# Patient Record
(demographics unavailable — no encounter records)

---

## 2024-10-22 NOTE — ASSESSMENT
[FreeTextEntry1] : 75 year F WITH MODERATE RT KNEE PAIN. HAS TRIED GEL INJECTIONS IN THE PAST WITH NO SIGNIFICANT RELIEF. HAS CSI IN JUNE WHICH HELPED. PAIN WORSENS WITH STAIRS AND WALKING PROLONGED DISTANCES. PAIN IS AFFECTING ADL AND FUNCTIONAL ACTIVITIES. XRAYS REVIEWED WITH MODERATE TO ADVANCED TRICOMPARTMENTAL OA. TREATMENT OPTIONS REVIEWED. QUESTIONS ANSWERED. RT TKA DISCUSSED AT LENGTH. ALLERGY TESTING DISCUSSED.   *ANAPHYLACTIC REACTION TO TYLENOL/PENICILLIN PMHx: HTN, H/O BLADDER CANCER, HLD, SMALL BOWEL OBSTRUCTION - UNKNOWN CAUSE *POSSIBLE METAL ALLERGY NO H/O DM NO H/O DVT/PE NO H/O MRSA/VRSA  RT TKA -   DISCUSSED R&B OF TKA. WILL ORDER CT TO EVAL FOR BONE LOSS AND DEFORMITY FOR PRE-OP PLANNING.   The patient was advised of the diagnosis. The natural history of the pathology was explained in full to the patient in layman's terms. All questions were answered. The risks and benefits of surgical and non-surgical treatment alternatives were explained in full to the patient.   We discussed my findings and the natural history of their condition. We talked about the details of the proposed surgery and the recovery. We discussed the material risks, possible benefits and alternatives to surgery. The risks include but are not limited to infection, bleeding and possible need for blood transfusion, fracture, bowel blockage, bladder retention or infection, need for reoperation, stiffness and/or limited range of motion, possible damage to nerves and blood vessels, failure of fixation of components, risk of deep vein thromboses and pulmonary embolism, wound healing problems. Although incredibly rare, we also discussed the risks of a cardiac event, stroke and even death during, or following, the surgery. We discussed the type of implants the patient will be receiving and the type of fixation that will be used, as well as whether a robot or computer navigation aide will be used. The patient understands they will need medical clearance and will attend a preoperative joint education class. We also discussed the type of anesthesia they will receive, and the risks associated with hospital or rehab length of stay, obesity, diabetes and smoking.   Patient Complains of pain in the affected joint with a level that often reaches greater than a 8/10. The pain has been progressively worsening throughout his/her treatment course. The pain has interfered with their ADLs and worsens with weight bearing. On exam they often have episodes of swelling/effusion with limited ROM. Pain worsens with ROM passive and active and I can palpate crepitus.   X- rays were reviewed with the patient and they show joint space narrowing, subchondral sclerosis, osteophyte formation, and subchondral cysts. After a period of more than 12 weeks physical therapy or exercise program done with me or another treating physician they have continued pain. The patient has failed a trial of NSAID medication or pain relievers if they were unable to tolerate NSAID medications as well as a series of injections, steroids, or hyaluronic acid. After a long discussion with the patient both the patient and I have decided we have exhausted all forms of less radical treatments and they would like to proceed with Total Joint Replacement.   Patient will plan to schedule surgery. Patient requires rolling walker and 3-in-1 commode S/P surgery. Patient currently as limited mobility that significantly impairs their ability to participate in one or more mobility related activities of daily living in the home. The patient is able to safely use the walker and the functional mobility deficit can be sufficiently resolved with the use of a walker. Patient will also be confined to one level of the home environment and there is no toilet on that level. Requires 3-in-1 commode for bedside use as patient cannot access bathroom safely in their home in timely manner. Will order rolling walker and 3-in-1 commode.

## 2024-10-22 NOTE — DISCUSSION/SUMMARY
[de-identified] : I, Dian Frost, am scribing for Dr. Padron in his presence for the chief complaint, physical exam, studies, assessment, and/or plan.
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
## 2024-10-22 NOTE — PHYSICAL EXAM
[Right] : right knee [] : patient ambulates without assistive device [TWNoteComboBox7] : flexion 130 degrees [de-identified] : extension 0 degrees

## 2024-11-14 NOTE — PHYSICAL EXAM
[Rhythm Regular] : regular [Normal S1] : normal S1 [Normal S2] : normal S2 [No Murmur] : no murmurs heard [No Pitting Edema] : no pitting edema present [No Abnormalities] : the abdominal aorta was not enlarged and no bruit was heard [Normal] : moves all extremities, no focal deficits, normal speech

## 2024-11-15 NOTE — HISTORY OF PRESENT ILLNESS
[FreeTextEntry1] : 75 year old Woman with a history of HTN, HLD, idiopathic dilated cardiomyopathy EF 40% s/p Martinez SL ICD implant on 3/3/08 s/p generator change on 1/18/18, bladder cancer (s/p radical cystectomy and DEVAUGHN with urostomy creation) and has been receiving BCG injections, and has had multiple cystoscopy procedures. She presents for cardiac clearance for her right knee surgery on Monday 11/25/24 by Dr. Larry Padron at [facility].  On routine ICD check 12/18 she was found to have a 10 second episode of probable V. tach occurring at a rate of 158 beats per minute for 10 secs. Because of a self-limited the patient no symptoms and it did not cause a defibrillator event. At the present time the patient is asymptomatic. Last ICD check 8/21 showed no arrhythmia.  One episode of non-sustained V. tach for 13 beats at 179 bpm 1/21. She is on Synthroid 0.025 mg once a day ICD check 6/20 demonstrated SVT for 40secs at rate of 153bpm.  October 2013, she underwent a definitive surgical procedure to remove the bladder and creation of an ileal conduit. This was performed at [facility]. She had an echocardiogram at that time that showed a drop in her ejection fraction to 48% and she was started on Toprol 25 mg once a day as a cardioprotective drug. This made her hypotensive and dizzy and this was stopped.  Most recent echo performed 1/19 demonstrates an ejection fraction of 50%. There is minimal MR, and mild AI and TR with stage I diastolic dysfunction.  She had been doing quite well. Her dizziness has improved. She reports it was a eustachian ear problem which was resolved with nasal spray medication given by her PCP.  When she did have dizziness in 7/2022, she ended up going to urgent care, where she was found to have frequent PVCs, and was sent to the ER. There, she was found to be significantly orthostatic. Since then, it has been resolved.   Echo in 12/21 with possible mild decline in EF to 45%. I repeated the study 8/9/22, and confirmed mild LV dysfunction, with an EF 44%.  She had a single episode of 6 second of NSVT on 5/28/2022. A pharmacologic stress test in 10/2022 demonstrated possible infarct of the inferior and infero-apical walls, with an EF of 45%.  When I repeated a routine echocardiogram in 2/2023, her EF was noted to be lower, at 38% with eccentric LVH. Her LDL has been well controlled in the 90 range. In 3/2023, she had a CTA coronary demonstrated non obstructive CAD, mild LAD (40-50% luminal narrowing), mild RCA (30%).  in 2024  She was seen in 11/23. She he has no chest pain, difficulty breathing or palpitations.  Echo in 12/21 with possible mild decline in EF to 45%. This has stabilized at around 40% on her last echocardiogram (the apex, septum and basal inferior wall were hypokinetic).  Most recent echo on 4/22/24 revealing LV systolic function moderately decreased at EF 40%.  She presents today for cardiac clearance for her right knee replacement. She has no complaints.  She denies chest pains or pressure, chest pressure, dizzy spells, near syncope or syncope.  She denies difficulty breathing while lying flat. She denies lower extremity swelling.  Medication reconciliation was performed. She is complaint with her medications.

## 2024-11-15 NOTE — CARDIOLOGY SUMMARY
[de-identified] : sinus PVD [de-identified] : October 2022\par  Pharmacologic\par  Small mild defect in the inferior and inferior apical walls that is fixed suggestive of infarct\par  Inferior wall and septum mildly hypokinetic\par  LVEF 45% [de-identified] : 4/22/24: EF 40%, +RWMA, septum & basal inferior wall appear more HK. Normal RV size & fxn, LA dilated, mild MR, mild AI and trace pericardial effusion.   7/2023 EF 41%, Eccentric LVH, Mild grade 1 diastolic, Mild MR/TR [de-identified] : 3/2023: CTA coronary with non obstructive CAD, mild LAD (40-50% luminal narrowing), mild RCA (30%). Trace pericardial effusion Patent PFO suspected [de-identified] : [employer] ICD Remote September 2023 Battery longevity 5.4 years Normally functioning Riata lead

## 2024-11-15 NOTE — HISTORY OF PRESENT ILLNESS
[FreeTextEntry1] : 75 year old Woman with a history of HTN, HLD, idiopathic dilated cardiomyopathy EF 40% s/p Martinez SL ICD implant on 3/3/08 s/p generator change on 1/18/18, bladder cancer (s/p radical cystectomy and DEVAUGHN with urostomy creation) and has been receiving BCG injections, and has had multiple cystoscopy procedures. She presents for cardiac clearance for her right knee surgery on Monday 11/25/24 by Dr. Larry Padron at [facility].  On routine ICD check 12/18 she was found to have a 10 second episode of probable V. tach occurring at a rate of 158 beats per minute for 10 secs. Because of a self-limited the patient no symptoms and it did not cause a defibrillator event. At the present time the patient is asymptomatic. Last ICD check 8/21 showed no arrhythmia.  One episode of non-sustained V. tach for 13 beats at 179 bpm 1/21. She is on Synthroid 0.025 mg once a day ICD check 6/20 demonstrated SVT for 40secs at rate of 153bpm.  October 2013, she underwent a definitive surgical procedure to remove the bladder and creation of an ileal conduit. This was performed at [facility]. She had an echocardiogram at that time that showed a drop in her ejection fraction to 48% and she was started on Toprol 25 mg once a day as a cardioprotective drug. This made her hypotensive and dizzy and this was stopped.  Most recent echo performed 1/19 demonstrates an ejection fraction of 50%. There is minimal MR, and mild AI and TR with stage I diastolic dysfunction.  She had been doing quite well. Her dizziness has improved. She reports it was a eustachian ear problem which was resolved with nasal spray medication given by her PCP.  When she did have dizziness in 7/2022, she ended up going to urgent care, where she was found to have frequent PVCs, and was sent to the ER. There, she was found to be significantly orthostatic. Since then, it has been resolved.   Echo in 12/21 with possible mild decline in EF to 45%. I repeated the study 8/9/22, and confirmed mild LV dysfunction, with an EF 44%.  She had a single episode of 6 second of NSVT on 5/28/2022. A pharmacologic stress test in 10/2022 demonstrated possible infarct of the inferior and infero-apical walls, with an EF of 45%.  When I repeated a routine echocardiogram in 2/2023, her EF was noted to be lower, at 38% with eccentric LVH. Her LDL has been well controlled in the 90 range. In 3/2023, she had a CTA coronary demonstrated non obstructive CAD, mild LAD (40-50% luminal narrowing), mild RCA (30%).  in 2024  She was seen in 11/23. She he has no chest pain, difficulty breathing or palpitations.  Echo in 12/21 with possible mild decline in EF to 45%. This has stabilized at around 40% on her last echocardiogram (the apex, septum and basal inferior wall were hypokinetic).  Most recent echo on 4/22/24 revealing LV systolic function moderately decreased at EF 40%.  She presents today for cardiac clearance for her right knee replacement. She has no complaints.  She denies chest pains or pressure, chest pressure, dizzy spells, near syncope or syncope.  She denies difficulty breathing while lying flat. She denies lower extremity swelling.  Medication reconciliation was performed. She is complaint with her medications.

## 2024-11-15 NOTE — DISCUSSION/SUMMARY
[EKG obtained to assist in diagnosis and management of assessed problem(s)] : EKG obtained to assist in diagnosis and management of assessed problem(s) [FreeTextEntry1] : Overall, Mrs. Kaiser is doing well.  She arrives in no acute distress.  She does not have any complaints except right knee pain.  She is euvolemic on examination.  ECG illustrates sinus rhythm.  Her blood pressure is normal at 112/70.  Martinez SL ICD was interrogated today by device Rep.  ICD working appropriately with good battery life.  She does not have any recent ICD shocks.      CTA 3/2023 confirms non obstructive CAD, mild LAS (40-50% luminal narrowing), mild RCA (30%). Recent repeat echocardiogram 4/2024 noted her EF to be stable at 40%.   We will continue to medically manage her cardiomyopathy.  She will continue Carvedilol 3.125mg twice daily (of note, she did not tolerate metoprolol in the past). She will continue Valsartan 40mg (1/2 pill) daily- she reports dizzy spells on 40mg. She continues to be hesitant to switch to Entresto because she feels well. She will continue Lipitor 10mg (1/2 pill) daily for lipid management for LDL < 100.  Carotid dopplers in 8/2024 revealed bilateral mild atherosclerosis and a right thyroid nodule.    Lifestyle modifications including diet-low-salt, exercise and weight maintenance was discussed in detail for optimal cardiovascular outcomes.  Mrs. Kaiser is optimized for the upcoming procedure with no cardiac contraindications.  There is no evidence of active ischemic heart disease, decompensated heart failure, uncontrolled arrhythmia, or severe obstructive valvular disease.  Routine hemodynamic monitoring will be sufficient.    She tested positive for the Ambulatory depression screen questionnaire.  She was told to refer to her primary care physician Dr. Gwyn Ramírez for follow up.

## 2024-11-15 NOTE — CARDIOLOGY SUMMARY
[de-identified] : sinus PVD [de-identified] : October 2022\par  Pharmacologic\par  Small mild defect in the inferior and inferior apical walls that is fixed suggestive of infarct\par  Inferior wall and septum mildly hypokinetic\par  LVEF 45% [de-identified] : 4/22/24: EF 40%, +RWMA, septum & basal inferior wall appear more HK. Normal RV size & fxn, LA dilated, mild MR, mild AI and trace pericardial effusion.   7/2023 EF 41%, Eccentric LVH, Mild grade 1 diastolic, Mild MR/TR [de-identified] : 3/2023: CTA coronary with non obstructive CAD, mild LAD (40-50% luminal narrowing), mild RCA (30%). Trace pericardial effusion Patent PFO suspected [de-identified] : [employer] ICD Remote September 2023 Battery longevity 5.4 years Normally functioning Riata lead

## 2024-11-15 NOTE — ADDENDUM
[FreeTextEntry1] : stable CM without symptoms cont current med regimen icd with good battery life no cardiac contraindication to proceeding with knee replacement. Routine cardiac monitoring is recommended.

## 2024-12-10 NOTE — ASSESSMENT
[FreeTextEntry1] : S/P RT TKA 11/25/24: NO F/C/S. DOING WELL. XRAYS REVIEWED WITH COMPONENTS WELL FIXED. NO SIGNS OF INFECTION. GOOD LIGAMENT BALANCE ON EXAM.  DISCUSSED THE IMPORTANCE OF ELEVATION TO REDUCE SWELLING. PROPER ELEVATION TECHNIQUES DISCUSSED. ABX AND PRECAUTIONS REVIEWED. PT RX. QUESTIONS ANSWERED.   WILL ORDER STAT VENOUS DOPPLER OF LE DUE TO SWELLING POST OP, R/O DVT. PATIENT HAS LOWER EXTREMITY SWELLING. I AM CONCERNED FOR DVT. WILL SEND PATIENT FOR EMERGENT ULTRASOUND FOR DIAGNOSIS. PATIENT UNDERSTANDS THEY WILL PROCEED TO EMERGENCY ROOM IF POSITIVE. PATIENT UNDERSTANDS DVT AND PE CAN BE LIFE THREATENING.
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
## 2024-12-10 NOTE — IMAGING
[Right] : right knee [AP] : anteroposterior [Lateral] : lateral [[place]] : skyline [Components well fixed, in good position] : Components well fixed, in good position

## 2024-12-10 NOTE — PHYSICAL EXAM
[Right] : right knee [] : ambulation with cane [TWNoteComboBox7] : flexion 100 degrees [de-identified] : extension 0 degrees

## 2025-01-14 NOTE — PHYSICAL EXAM
[Right] : right knee [] : patient ambulates without assistive device [TWNoteComboBox7] : flexion 120 degrees [de-identified] : extension 0 degrees

## 2025-01-14 NOTE — IMAGING
[Right] : right knee [AP] : anteroposterior [Lateral] : lateral [[place]] : skyline [Components well fixed, in good position] : Components well fixed, in good position

## 2025-01-14 NOTE — ASSESSMENT
[FreeTextEntry1] : S/P RT TKA 11/25/24: NO F/C/S. DOING WELL. XRAYS REVIEWED WITH COMPONENTS WELL FIXED. NO SIGNS OF INFECTION. GOOD LIGAMENT BALANCE ON EXAM.  WE AGAIN DISCUSSED ABX PPX FOR DENTAL PROCEDURES FOR AT LEAST 2 YEARS FROM SURGERY. PT RX. QUESTIONS ANSWERED.   WILL RTW 2/10/25.
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
## 2025-03-12 NOTE — DISCUSSION/SUMMARY
[FreeTextEntry1] : Overall, Fatmata is doing ok. She has dizziness recently, which is not typically positional, along with fatigue and labile BP.  We will try switching her valsartan to nighttime. She will remain on Coreg 3.125 bid for now. We will repeat an echocardiogram this month, and I have requested a more recent PPM interrogation. She promises to stay hydrated. CTA confirms non obstructive CAD, mild LAD (40-50% luminal narrowing), mild RCA (30%). She will continue Lipitor 10mg (1/2 pill) daily for lipid management. Lifestyle modifications including diet-low-salt, exercise and weight maintenance was discussed in detail for optimal CV outcomes.  She will follow up in 3-6 months.  [EKG obtained to assist in diagnosis and management of assessed problem(s)] : EKG obtained to assist in diagnosis and management of assessed problem(s)

## 2025-03-12 NOTE — PHYSICAL EXAM
[Rhythm Regular] : regular [Normal S1] : normal S1 [Normal S2] : normal S2 [No Murmur] : no murmurs heard [No Pitting Edema] : no pitting edema present [No Abnormalities] : the abdominal aorta was not enlarged and no bruit was heard [Normal] : alert and oriented, normal memory

## 2025-03-12 NOTE — CARDIOLOGY SUMMARY
[de-identified] : sinus PVD [de-identified] : October 2022\par  Pharmacologic\par  Small mild defect in the inferior and inferior apical walls that is fixed suggestive of infarct\par  Inferior wall and septum mildly hypokinetic\par  LVEF 45% [de-identified] : 4/22/24: EF 40%, +RWMA, septum & basal inferior wall appear more HK. Normal RV size & fxn, LA dilated, mild MR, mild AI and trace pericardial effusion.   7/2023 EF 41%, Eccentric LVH, Mild grade 1 diastolic, Mild MR/TR [de-identified] : 3/2023: CTA coronary with non obstructive CAD, mild LAD (40-50% luminal narrowing), mild RCA (30%). Trace pericardial effusion Patent PFO suspected [de-identified] : [employer] ICD Remote September 2023 Battery longevity 5.4 years Normally functioning Riata lead

## 2025-04-15 NOTE — IMAGING
[Right] : right knee [AP] : anteroposterior [Lateral] : lateral [[place]] : skyline [Components well fixed, in good position] : Components well fixed, in good position

## 2025-04-15 NOTE — IMAGING
[Right] : right knee [AP] : anteroposterior [Lateral] : lateral [[place]] : skyline [Components well fixed, in good position] : Components well fixed, in good position

## 2025-04-17 NOTE — DISCUSSION/SUMMARY
[de-identified] : I, Hallie Chambers, am scribing for Dr. Padron in his presence for the chief complaint, physical exam, studies, assessment, and/or plan.

## 2025-04-17 NOTE — PHYSICAL EXAM
[Right] : right knee [] : no sign of infection [TWNoteComboBox7] : flexion 130 degrees [de-identified] : extension 0 degrees

## 2025-04-17 NOTE — ASSESSMENT
[FreeTextEntry1] : S/P RT TKA 11/25/24: NO F/C/S. DOING WELL. XRAYS REVIEWED WITH COMPONENTS WELL FIXED. NO SIGNS OF INFECTION. GOOD LIGAMENT BALANCE ON EXAM.  WE AGAIN DISCUSSED ABX PPX FOR DENTAL PROCEDURES FOR AT LEAST 2 YEARS FROM SURGERY. QUESTIONS ANSWERED. WILL FOLLOW UP IN NOV WITH REPEAT X-RAYS.

## 2025-04-17 NOTE — HISTORY OF PRESENT ILLNESS
[Constant] : constant [Heat] : heat [Sitting] : sitting [Standing] : standing [Stairs] : stairs [] : Post Surgical Visit: yes [de-identified] : 1.14.25; PATIENT IS HERE FOR A POST OP VISIT OF THE RIGHT KNEE. PAIN HAS IMRPOVED SLIGHTLY WITH PHYSICAL THERAPY.   4.15.25 PATIENT HERE FOR RIGHT KNEE PAIN. DOS: 11.25.24    [de-identified] : 11/25/24

## 2025-04-17 NOTE — PHYSICAL EXAM
[Right] : right knee [] : no sign of infection [TWNoteComboBox7] : flexion 130 degrees [de-identified] : extension 0 degrees

## 2025-04-17 NOTE — DISCUSSION/SUMMARY
[de-identified] : I, Hallie Chambers, am scribing for Dr. Padron in his presence for the chief complaint, physical exam, studies, assessment, and/or plan.

## 2025-04-17 NOTE — HISTORY OF PRESENT ILLNESS
[Constant] : constant [Heat] : heat [Sitting] : sitting [Standing] : standing [Stairs] : stairs [] : Post Surgical Visit: yes [de-identified] : 1.14.25; PATIENT IS HERE FOR A POST OP VISIT OF THE RIGHT KNEE. PAIN HAS IMRPOVED SLIGHTLY WITH PHYSICAL THERAPY.   4.15.25 PATIENT HERE FOR RIGHT KNEE PAIN. DOS: 11.25.24    [de-identified] : 11/25/24

## 2025-05-01 NOTE — PHYSICAL EXAM
[General Appearance - Well Developed] : well developed [Normal Appearance] : normal appearance [Well Groomed] : well groomed [General Appearance - Well Nourished] : well nourished [No Deformities] : no deformities [General Appearance - In No Acute Distress] : no acute distress [Heart Rate And Rhythm] : heart rate and rhythm were normal [Heart Sounds] : normal S1 and S2 [Murmurs] : no murmurs present [Respiration, Rhythm And Depth] : normal respiratory rhythm and effort [Exaggerated Use Of Accessory Muscles For Inspiration] : no accessory muscle use [Auscultation Breath Sounds / Voice Sounds] : lungs were clear to auscultation bilaterally [Left Infraclavicular] : left infraclavicular area [Clean] : clean [Dry] : dry [Well-Healed] : well-healed [Abdomen Tenderness] : non-tender [Abdomen Soft] : soft [Abdomen Mass (___ Cm)] : no abdominal mass palpated [Nail Clubbing] : no clubbing of the fingernails [Cyanosis, Localized] : no localized cyanosis [Petechial Hemorrhages (___cm)] : no petechial hemorrhages [] : no ischemic changes

## 2025-05-01 NOTE — CARDIOLOGY SUMMARY
[___] : [unfilled] [LVEF ___%] : LVEF [unfilled]% [Moderate] : moderate LV dysfunction [None] : no pulmonary hypertension [Normal] : normal LA size [Mild] : mild mitral regurgitation

## 2025-05-05 NOTE — HISTORY OF PRESENT ILLNESS
[None] : The patient complains of no symptoms [de-identified] : 76 yr old female with PMHx significant for Cardiomyopathy, paroxysmal SVT, Paroxysmal ventricular tachycardia, PVC's, carotid artery disease who presented to device clinic for ICD interrogation.  Patient denies chest pain, palpitations, dizziness, lightheadedness, SOB, MACK, PND, orthopnea, LE edema, syncope or near syncope, or shocks from the device.   Patient remains physically active despite osteoarthritis s/p right TKR. Patient endorses compliance with medical regimen.

## 2025-05-05 NOTE — PROCEDURE
[No] : not [NSR] : normal sinus rhythm [ICD] : Implantable cardioverter-defibrillator [VVI] : VVI [Charge Time: ___ sec] : charge time was [unfilled] seconds [Threshold Testing Performed] : Threshold testing was performed [Lead Imp:  ___ohms] : lead impedance was [unfilled] ohms [___V @] : [unfilled] V [___ ms] : [unfilled] ms [None] : none [Counters Reset] : the counters were reset [Pace ___ %] : Pace [unfilled]% [de-identified] : [employer] [de-identified] : Fortify [employer] [de-identified] : [id number] [de-identified] : 01/18/2018 [de-identified] : 50 [de-identified] : Longevity estimation 3.7 yrs [de-identified] : Appropriate sensing and capture threshold; stable lead impedances and battery status. Battery shows 3.7 yrs longevity. One tachy detection 01/15/2025 @ 3:13PMc/w NSVT 11 beats and 4 seconds duration. No therapies were delivered.

## 2025-05-05 NOTE — END OF VISIT
[FreeTextEntry3] : I, Dr. Benny Nur, personally performed the evaluation and management (E/M) services for this established patient who presents today with (a) new problem(s)/exacerbation of (an) existing condition(s).  That E/M includes conducting the examination, assessing all new/exacerbated conditions, and establishing a new plan of care.  Today my NP, Nieves De Anda was here to observe my evaluation and management services for this new problem/exacerbated condition to be followed going forward. [Time Spent: ___ minutes] : I have spent [unfilled] minutes of time on the encounter which excludes teaching and separately reported services.

## 2025-05-05 NOTE — DISCUSSION/SUMMARY
[AICD Function Normal] : normal AICD function [Routine Follow-up in 3-4 months] : routine follow-up in 3-4 months [Patient] : the patient [FreeTextEntry2] : no changes [de-identified] : Remote monitoring Q 91 days and annual in-person device interrogation.  [FreeTextEntry1] : 76 yr old female with PMHx significant for Cardiomyopathy, paroxysmal SVT, Paroxysmal ventricular tachycardia, PVC's, carotid artery disease who presented to device clinic for ICD interrogation.  Patient denies chest pain, palpitations, dizziness, lightheadedness, SOB, MACK, PND, orthopnea, LE edema, syncope or near syncope, or shocks from the device.   Patient remains physically active despite osteoarthritis s/p right TKR. Patient endorses compliance with medical regimen.     Impression: Cardiomyopathy; paroxysmal ventricular tachycardia s/p primary prevention ICD                    Normal device function in VVI mode                     One tachy episode 01/15/2025 c/w NSVT; 11 beats and 4 sec duration; no therapies delivered  Plan: Now able to connect with remote monitoring per [employer]          f/u Q 91 days remotely and annual device interrogation in person         Continue carvedilol/ lipitor/ and valsartan as prescribed.

## 2025-05-05 NOTE — HISTORY OF PRESENT ILLNESS
[None] : The patient complains of no symptoms [de-identified] : 76 yr old female with PMHx significant for Cardiomyopathy, paroxysmal SVT, Paroxysmal ventricular tachycardia, PVC's, carotid artery disease who presented to device clinic for ICD interrogation.  Patient denies chest pain, palpitations, dizziness, lightheadedness, SOB, MACK, PND, orthopnea, LE edema, syncope or near syncope, or shocks from the device.   Patient remains physically active despite osteoarthritis s/p right TKR. Patient endorses compliance with medical regimen.

## 2025-05-05 NOTE — PROCEDURE
[No] : not [NSR] : normal sinus rhythm [ICD] : Implantable cardioverter-defibrillator [VVI] : VVI [Charge Time: ___ sec] : charge time was [unfilled] seconds [Threshold Testing Performed] : Threshold testing was performed [Lead Imp:  ___ohms] : lead impedance was [unfilled] ohms [___V @] : [unfilled] V [___ ms] : [unfilled] ms [None] : none [Counters Reset] : the counters were reset [Pace ___ %] : Pace [unfilled]% [de-identified] : [employer] [de-identified] : [employer] [de-identified] : [id number] [de-identified] : 01/18/2018 [de-identified] : 50 [de-identified] : Longevity estimation 3.7 yrs [de-identified] : Appropriate sensing and capture threshold; stable lead impedances and battery status. Battery shows 3.7 yrs longevity. One tachy detection 01/15/2025 @ 3:13PMc/w NSVT 11 beats and 4 seconds duration. No therapies were delivered.

## 2025-05-05 NOTE — DISCUSSION/SUMMARY
[AICD Function Normal] : normal AICD function [Routine Follow-up in 3-4 months] : routine follow-up in 3-4 months [Patient] : the patient [FreeTextEntry2] : no changes [de-identified] : Remote monitoring Q 91 days and annual in-person device interrogation.  [FreeTextEntry1] : 76 yr old female with PMHx significant for Cardiomyopathy, paroxysmal SVT, Paroxysmal ventricular tachycardia, PVC's, carotid artery disease who presented to device clinic for ICD interrogation.  Patient denies chest pain, palpitations, dizziness, lightheadedness, SOB, MACK, PND, orthopnea, LE edema, syncope or near syncope, or shocks from the device.   Patient remains physically active despite osteoarthritis s/p right TKR. Patient endorses compliance with medical regimen.     Impression: Cardiomyopathy; paroxysmal ventricular tachycardia s/p primary prevention ICD                    Normal device function in VVI mode                     One tachy episode 01/15/2025 c/w NSVT; 11 beats and 4 sec duration; no therapies delivered  Plan: Now able to connect with remote monitoring per [employer]          f/u Q 91 days remotely and annual device interrogation in person         Continue carvedilol/ lipitor/ and valsartan as prescribed.